# Patient Record
Sex: FEMALE | Race: WHITE | Employment: FULL TIME
[De-identification: names, ages, dates, MRNs, and addresses within clinical notes are randomized per-mention and may not be internally consistent; named-entity substitution may affect disease eponyms.]

---

## 2023-11-27 SDOH — HEALTH STABILITY: PHYSICAL HEALTH: ON AVERAGE, HOW MANY MINUTES DO YOU ENGAGE IN EXERCISE AT THIS LEVEL?: 0 MIN

## 2023-11-27 SDOH — HEALTH STABILITY: PHYSICAL HEALTH: ON AVERAGE, HOW MANY DAYS PER WEEK DO YOU ENGAGE IN MODERATE TO STRENUOUS EXERCISE (LIKE A BRISK WALK)?: 0 DAYS

## 2023-11-27 ASSESSMENT — SOCIAL DETERMINANTS OF HEALTH (SDOH)
WITHIN THE LAST YEAR, HAVE YOU BEEN KICKED, HIT, SLAPPED, OR OTHERWISE PHYSICALLY HURT BY YOUR PARTNER OR EX-PARTNER?: NO
WITHIN THE LAST YEAR, HAVE TO BEEN RAPED OR FORCED TO HAVE ANY KIND OF SEXUAL ACTIVITY BY YOUR PARTNER OR EX-PARTNER?: NO
WITHIN THE LAST YEAR, HAVE YOU BEEN HUMILIATED OR EMOTIONALLY ABUSED IN OTHER WAYS BY YOUR PARTNER OR EX-PARTNER?: YES
WITHIN THE LAST YEAR, HAVE YOU BEEN AFRAID OF YOUR PARTNER OR EX-PARTNER?: NO

## 2023-11-30 ENCOUNTER — OFFICE VISIT (OUTPATIENT)
Facility: CLINIC | Age: 33
End: 2023-11-30
Payer: MEDICARE

## 2023-11-30 VITALS
HEART RATE: 105 BPM | RESPIRATION RATE: 15 BRPM | OXYGEN SATURATION: 98 % | DIASTOLIC BLOOD PRESSURE: 80 MMHG | TEMPERATURE: 97.9 F | WEIGHT: 153 LBS | SYSTOLIC BLOOD PRESSURE: 114 MMHG | HEIGHT: 65 IN | BODY MASS INDEX: 25.49 KG/M2

## 2023-11-30 DIAGNOSIS — Z11.59 ENCOUNTER FOR HEPATITIS C SCREENING TEST FOR LOW RISK PATIENT: ICD-10-CM

## 2023-11-30 DIAGNOSIS — F41.9 ANXIETY: ICD-10-CM

## 2023-11-30 DIAGNOSIS — F32.A DEPRESSIVE DISORDER: ICD-10-CM

## 2023-11-30 DIAGNOSIS — R20.0 NUMBNESS AND TINGLING IN BOTH HANDS: ICD-10-CM

## 2023-11-30 DIAGNOSIS — F90.2 ATTENTION DEFICIT HYPERACTIVITY DISORDER (ADHD), COMBINED TYPE: ICD-10-CM

## 2023-11-30 DIAGNOSIS — M25.551 PAIN OF RIGHT HIP: ICD-10-CM

## 2023-11-30 DIAGNOSIS — E06.3 HASHIMOTO'S THYROIDITIS: ICD-10-CM

## 2023-11-30 DIAGNOSIS — K62.5 BLEEDING FROM ANUS: ICD-10-CM

## 2023-11-30 DIAGNOSIS — Z72.0 VAPES NICOTINE CONTAINING SUBSTANCE: ICD-10-CM

## 2023-11-30 DIAGNOSIS — B00.9 HSV INFECTION: ICD-10-CM

## 2023-11-30 DIAGNOSIS — F60.3 BORDERLINE PERSONALITY DISORDER IN ADULT (HCC): ICD-10-CM

## 2023-11-30 DIAGNOSIS — R09.89 SINUS COMPLAINT: ICD-10-CM

## 2023-11-30 DIAGNOSIS — Z11.4 SCREENING FOR HIV WITHOUT PRESENCE OF RISK FACTORS: ICD-10-CM

## 2023-11-30 DIAGNOSIS — Z76.89 ENCOUNTER TO ESTABLISH CARE: Primary | ICD-10-CM

## 2023-11-30 DIAGNOSIS — R20.2 NUMBNESS AND TINGLING IN BOTH HANDS: ICD-10-CM

## 2023-11-30 PROCEDURE — 99205 OFFICE O/P NEW HI 60 MIN: CPT | Performed by: STUDENT IN AN ORGANIZED HEALTH CARE EDUCATION/TRAINING PROGRAM

## 2023-11-30 PROCEDURE — 99407 BEHAV CHNG SMOKING > 10 MIN: CPT | Performed by: STUDENT IN AN ORGANIZED HEALTH CARE EDUCATION/TRAINING PROGRAM

## 2023-11-30 RX ORDER — DEXTROAMPHETAMINE SACCHARATE, AMPHETAMINE ASPARTATE, DEXTROAMPHETAMINE SULFATE AND AMPHETAMINE SULFATE 5; 5; 5; 5 MG/1; MG/1; MG/1; MG/1
1 TABLET ORAL 2 TIMES DAILY
Qty: 60 TABLET | Refills: 0 | Status: SHIPPED | OUTPATIENT
Start: 2023-11-30 | End: 2023-12-30

## 2023-11-30 RX ORDER — DEXTROAMPHETAMINE SACCHARATE, AMPHETAMINE ASPARTATE, DEXTROAMPHETAMINE SULFATE AND AMPHETAMINE SULFATE 5; 5; 5; 5 MG/1; MG/1; MG/1; MG/1
1 TABLET ORAL 2 TIMES DAILY
COMMUNITY
Start: 2023-10-22 | End: 2023-11-30 | Stop reason: SDUPTHER

## 2023-11-30 RX ORDER — VALACYCLOVIR HYDROCHLORIDE 1 G/1
1000 TABLET, FILM COATED ORAL 2 TIMES DAILY
Qty: 20 TABLET | Refills: 3 | Status: SHIPPED | OUTPATIENT
Start: 2023-11-30 | End: 2024-01-09

## 2023-11-30 SDOH — ECONOMIC STABILITY: HOUSING INSECURITY
IN THE LAST 12 MONTHS, WAS THERE A TIME WHEN YOU DID NOT HAVE A STEADY PLACE TO SLEEP OR SLEPT IN A SHELTER (INCLUDING NOW)?: NO

## 2023-11-30 SDOH — ECONOMIC STABILITY: FOOD INSECURITY: WITHIN THE PAST 12 MONTHS, THE FOOD YOU BOUGHT JUST DIDN'T LAST AND YOU DIDN'T HAVE MONEY TO GET MORE.: NEVER TRUE

## 2023-11-30 SDOH — ECONOMIC STABILITY: FOOD INSECURITY: WITHIN THE PAST 12 MONTHS, YOU WORRIED THAT YOUR FOOD WOULD RUN OUT BEFORE YOU GOT MONEY TO BUY MORE.: NEVER TRUE

## 2023-11-30 SDOH — ECONOMIC STABILITY: INCOME INSECURITY: HOW HARD IS IT FOR YOU TO PAY FOR THE VERY BASICS LIKE FOOD, HOUSING, MEDICAL CARE, AND HEATING?: NOT HARD AT ALL

## 2023-11-30 ASSESSMENT — PATIENT HEALTH QUESTIONNAIRE - PHQ9
4. FEELING TIRED OR HAVING LITTLE ENERGY: 3
SUM OF ALL RESPONSES TO PHQ QUESTIONS 1-9: 17
8. MOVING OR SPEAKING SO SLOWLY THAT OTHER PEOPLE COULD HAVE NOTICED. OR THE OPPOSITE, BEING SO FIGETY OR RESTLESS THAT YOU HAVE BEEN MOVING AROUND A LOT MORE THAN USUAL: 0
10. IF YOU CHECKED OFF ANY PROBLEMS, HOW DIFFICULT HAVE THESE PROBLEMS MADE IT FOR YOU TO DO YOUR WORK, TAKE CARE OF THINGS AT HOME, OR GET ALONG WITH OTHER PEOPLE: 2
9. THOUGHTS THAT YOU WOULD BE BETTER OFF DEAD, OR OF HURTING YOURSELF: 0
1. LITTLE INTEREST OR PLEASURE IN DOING THINGS: 1
7. TROUBLE CONCENTRATING ON THINGS, SUCH AS READING THE NEWSPAPER OR WATCHING TELEVISION: 3
6. FEELING BAD ABOUT YOURSELF - OR THAT YOU ARE A FAILURE OR HAVE LET YOURSELF OR YOUR FAMILY DOWN: 1
SUM OF ALL RESPONSES TO PHQ QUESTIONS 1-9: 17
SUM OF ALL RESPONSES TO PHQ9 QUESTIONS 1 & 2: 4
5. POOR APPETITE OR OVEREATING: 3
SUM OF ALL RESPONSES TO PHQ QUESTIONS 1-9: 17
3. TROUBLE FALLING OR STAYING ASLEEP: 3
2. FEELING DOWN, DEPRESSED OR HOPELESS: 3
SUM OF ALL RESPONSES TO PHQ QUESTIONS 1-9: 17

## 2023-11-30 NOTE — PROGRESS NOTES
Beatrice Broussard is a 29y.o. year old female who presents today for   Chief Complaint   Patient presents with    Establish Care       Is someone accompanying this pt? no    Is the patient using any DME equipment during OV? no    Depression Screenin/30/2023     3:22 PM   PHQ-9 Questionaire   Little interest or pleasure in doing things 1   Feeling down, depressed, or hopeless 3   Trouble falling or staying asleep, or sleeping too much 3   Feeling tired or having little energy 3   Poor appetite or overeating 3   Feeling bad about yourself - or that you are a failure or have let yourself or your family down 1   Trouble concentrating on things, such as reading the newspaper or watching television 3   Moving or speaking so slowly that other people could have noticed. Or the opposite - being so fidgety or restless that you have been moving around a lot more than usual 0   Thoughts that you would be better off dead, or of hurting yourself in some way 0   PHQ-9 Total Score 17   If you checked off any problems, how difficult have these problems made it for you to do your work, take care of things at home, or get along with other people? 2       Abuse Screenin/30/2023     3:00 PM   AMB Abuse Screening   Do you ever feel afraid of your partner? N   Are you in a relationship with someone who physically or mentally threatens you? N   Is it safe for you to go home? Y       Learning Assessment:  No question data found. Fall Risk:       No data to display                    Coordination of Care:   1. \"Have you been to the ER, urgent care clinic since your last visit? Hospitalized since your last visit? \" no    2. \"Have you seen or consulted any other health care providers outside of the [address] [place] [address] since your last visit? \" no    3. For patients aged 43-73: Has the patient had a colonoscopy / FIT/ Cologuard? NA    If the patient is female:    4.  For patients aged 43-66: Has the patient had a mammogram
that you are a failure or have let yourself or your family down 1   Trouble concentrating on things, such as reading the newspaper or watching television 3   Moving or speaking so slowly that other people could have noticed. Or the opposite - being so fidgety or restless that you have been moving around a lot more than usual 0   Thoughts that you would be better off dead, or of hurting yourself in some way 0   PHQ-2 Score 4   PHQ-9 Total Score 17   If you checked off any problems, how difficult have these problems made it for you to do your work, take care of things at home, or get along with other people? 2       Past Medical History  No past medical history on file. Surgical History  No past surgical history on file. Current Outpatient Medications   Medication Sig    valACYclovir (VALTREX) 1 g tablet Take 1 tablet by mouth 2 times daily    amphetamine-dextroamphetamine (ADDERALL) 20 MG tablet Take 1 tablet by mouth 2 times daily for 30 days. Max Daily Amount: 2 tablets     No current facility-administered medications for this visit. No Known Allergies   Family History  No family history on file.      Social History     Tobacco Use    Smoking status: Every Day     Types: Cigarettes    Smokeless tobacco: Never   Substance Use Topics    Alcohol use: Yes     Comment: social    Drug use: Yes     Types: Marijuana Graciella Muzzy)      Health Maintenance   Topic Date Due    Hepatitis B vaccine (1 of 3 - 3-dose series) Never done    COVID-19 Vaccine (1) Never done    Varicella vaccine (1 of 2 - 2-dose childhood series) Never done    Pneumococcal 0-64 years Vaccine (1 - PCV) Never done    HIV screen  Never done    Hepatitis C screen  Never done    DTaP/Tdap/Td vaccine (1 - Tdap) Never done    Cervical cancer screen  Never done    Flu vaccine (1) Never done    Depression Monitoring  11/30/2024    Hepatitis A vaccine  Aged Out    Hib vaccine  Aged Out    HPV vaccine  Aged Out    Meningococcal (ACWY) vaccine  Aged Out

## 2023-11-30 NOTE — PATIENT INSTRUCTIONS
It was nice meeting you today. Please have your labs done either immediately after this visit, or you can schedule to come back for labs. Please do your labs at least a week before your next appointment. If you have questions or concerns, My Chart is the fastest way to get in touch with me and the clinical staff. We will gradually address all of your problems. This will take time. You have a lot of medical concerns that will require visits to many specialists.

## 2023-12-01 PROBLEM — F41.9 ANXIETY: Status: ACTIVE | Noted: 2023-12-01

## 2023-12-01 PROBLEM — R20.2 NUMBNESS AND TINGLING IN BOTH HANDS: Status: ACTIVE | Noted: 2023-12-01

## 2023-12-01 PROBLEM — R09.89 SINUS COMPLAINT: Status: ACTIVE | Noted: 2021-11-27

## 2023-12-01 PROBLEM — Z72.0 VAPES NICOTINE CONTAINING SUBSTANCE: Status: ACTIVE | Noted: 2023-12-01

## 2023-12-01 PROBLEM — G89.29 CHRONIC RIGHT SI JOINT PAIN: Status: ACTIVE | Noted: 2023-12-01

## 2023-12-01 PROBLEM — F60.3 BORDERLINE PERSONALITY DISORDER IN ADULT (HCC): Status: ACTIVE | Noted: 2018-11-27

## 2023-12-01 PROBLEM — E06.3 HASHIMOTO'S THYROIDITIS: Status: ACTIVE | Noted: 2023-12-01

## 2023-12-01 PROBLEM — K62.5 BLEEDING FROM ANUS: Status: ACTIVE | Noted: 2020-11-27

## 2023-12-01 PROBLEM — R20.0 NUMBNESS AND TINGLING IN BOTH HANDS: Status: ACTIVE | Noted: 2023-12-01

## 2023-12-01 PROBLEM — F32.A DEPRESSIVE DISORDER: Status: ACTIVE | Noted: 2023-12-01

## 2023-12-01 PROBLEM — M53.3 CHRONIC RIGHT SI JOINT PAIN: Status: ACTIVE | Noted: 2023-12-01

## 2023-12-01 NOTE — ASSESSMENT & PLAN NOTE
- possible hemorrhoids  - colo, EGD, bx all negative; colo showed internal hemorrhoid  - advised pt to stop taking meloxicam

## 2023-12-05 ENCOUNTER — OFFICE VISIT (OUTPATIENT)
Facility: CLINIC | Age: 33
End: 2023-12-05
Payer: MEDICARE

## 2023-12-05 VITALS
HEART RATE: 87 BPM | OXYGEN SATURATION: 98 % | BODY MASS INDEX: 25.79 KG/M2 | TEMPERATURE: 97.6 F | RESPIRATION RATE: 16 BRPM | DIASTOLIC BLOOD PRESSURE: 74 MMHG | HEIGHT: 65 IN | WEIGHT: 154.8 LBS | SYSTOLIC BLOOD PRESSURE: 97 MMHG

## 2023-12-05 DIAGNOSIS — K62.5 BLEEDING FROM ANUS: Primary | ICD-10-CM

## 2023-12-05 DIAGNOSIS — M25.551 PAIN OF RIGHT HIP: ICD-10-CM

## 2023-12-05 PROCEDURE — 99214 OFFICE O/P EST MOD 30 MIN: CPT | Performed by: STUDENT IN AN ORGANIZED HEALTH CARE EDUCATION/TRAINING PROGRAM

## 2023-12-05 NOTE — PROGRESS NOTES
Tosin Whitaker is a 29y.o. year old female who presents today for   Chief Complaint   Patient presents with    Follow-up     Stomach issues       Is someone accompanying this pt? ***    Is the patient using any DME equipment during OV? ***    Depression Screenin/30/2023     3:22 PM   PHQ-9 Questionaire   Little interest or pleasure in doing things 1   Feeling down, depressed, or hopeless 3   Trouble falling or staying asleep, or sleeping too much 3   Feeling tired or having little energy 3   Poor appetite or overeating 3   Feeling bad about yourself - or that you are a failure or have let yourself or your family down 1   Trouble concentrating on things, such as reading the newspaper or watching television 3   Moving or speaking so slowly that other people could have noticed. Or the opposite - being so fidgety or restless that you have been moving around a lot more than usual 0   Thoughts that you would be better off dead, or of hurting yourself in some way 0   PHQ-9 Total Score 17   If you checked off any problems, how difficult have these problems made it for you to do your work, take care of things at home, or get along with other people? 2       Abuse Screenin/30/2023     3:00 PM   AMB Abuse Screening   Do you ever feel afraid of your partner? N   Are you in a relationship with someone who physically or mentally threatens you? N   Is it safe for you to go home? Y       Learning Assessment:  No question data found. Fall Risk:       No data to display                    Coordination of Care:   1. \"Have you been to the ER, urgent care clinic since your last visit? Hospitalized since your last visit? \" no    2. \"Have you seen or consulted any other health care providers outside of the [address] [place] [address] since your last visit? \" no    3. For patients aged 43-73: Has the patient had a colonoscopy / FIT/ Cologuard? no    If the patient is female:    4.  For patients aged 43-66: Has the patient
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
## 2023-12-06 ENCOUNTER — TELEPHONE (OUTPATIENT)
Facility: CLINIC | Age: 33
End: 2023-12-06

## 2023-12-06 NOTE — TELEPHONE ENCOUNTER
Pt called about a Rx she is currently taking and was suppose to  from the pharmacy but they are saying the Rx sent has to be paid out of pocket. Pt would like a call back. I believe she wants a medication that is covered  through her insurance. DMA/TNP

## 2023-12-07 NOTE — PROGRESS NOTES
Martina Guzman is a 29y.o. year old female who presents today for   Chief Complaint   Patient presents with    Follow-up     Stomach issues       Is someone accompanying this pt? no    Is the patient using any DME equipment during OV? no    Depression Screenin/30/2023     3:22 PM   PHQ-9 Questionaire   Little interest or pleasure in doing things 1   Feeling down, depressed, or hopeless 3   Trouble falling or staying asleep, or sleeping too much 3   Feeling tired or having little energy 3   Poor appetite or overeating 3   Feeling bad about yourself - or that you are a failure or have let yourself or your family down 1   Trouble concentrating on things, such as reading the newspaper or watching television 3   Moving or speaking so slowly that other people could have noticed. Or the opposite - being so fidgety or restless that you have been moving around a lot more than usual 0   Thoughts that you would be better off dead, or of hurting yourself in some way 0   PHQ-9 Total Score 17   If you checked off any problems, how difficult have these problems made it for you to do your work, take care of things at home, or get along with other people? 2       Abuse Screenin/30/2023     3:00 PM   AMB Abuse Screening   Do you ever feel afraid of your partner? N   Are you in a relationship with someone who physically or mentally threatens you? N   Is it safe for you to go home? Y       Learning Assessment:  No question data found. Fall Risk:       No data to display                    Coordination of Care:   1. \"Have you been to the ER, urgent care clinic since your last visit? Hospitalized since your last visit? \" no    2. \"Have you seen or consulted any other health care providers outside of the [address] [place] [address] since your last visit? \" no    3. For patients aged 43-73: Has the patient had a colonoscopy / FIT/ Cologuard? N/a    If the patient is female:    4.  For patients aged 43-66: Has the patient

## 2023-12-14 ENCOUNTER — OFFICE VISIT (OUTPATIENT)
Facility: CLINIC | Age: 33
End: 2023-12-14
Payer: MEDICARE

## 2023-12-14 VITALS
WEIGHT: 154.8 LBS | BODY MASS INDEX: 25.79 KG/M2 | RESPIRATION RATE: 17 BRPM | SYSTOLIC BLOOD PRESSURE: 103 MMHG | HEIGHT: 65 IN | OXYGEN SATURATION: 100 % | TEMPERATURE: 96.7 F | HEART RATE: 108 BPM | DIASTOLIC BLOOD PRESSURE: 77 MMHG

## 2023-12-14 DIAGNOSIS — R09.89 SINUS COMPLAINT: Primary | ICD-10-CM

## 2023-12-14 DIAGNOSIS — N94.10 DYSPAREUNIA IN FEMALE: ICD-10-CM

## 2023-12-14 PROCEDURE — 99214 OFFICE O/P EST MOD 30 MIN: CPT | Performed by: STUDENT IN AN ORGANIZED HEALTH CARE EDUCATION/TRAINING PROGRAM

## 2023-12-14 RX ORDER — AZELASTINE HYDROCHLORIDE, FLUTICASONE PROPIONATE 137; 50 UG/1; UG/1
1 SPRAY, METERED NASAL 2 TIMES DAILY
Qty: 23 G | Refills: 5 | Status: SHIPPED | OUTPATIENT
Start: 2023-12-14

## 2023-12-14 NOTE — PATIENT INSTRUCTIONS
[facility]  Address: [id number], [facility], [address]  Phone: [phone]  Please call the number to schedule. For your ears/nose/sinuses please take the nasal spray twice daily. You should receive a phone call from ENT.

## 2023-12-14 NOTE — ASSESSMENT & PLAN NOTE
- possible chronic rhinosinusitis, less likely chronic otitis media/externa  - trial of nasal spray  - ENT referral

## 2023-12-14 NOTE — PROGRESS NOTES
Levi Elliott is a 29y.o. year old female who presents today for   Chief Complaint   Patient presents with    Follow-up       Is someone accompanying this pt? no    Is the patient using any DME equipment during  [address]? no    Depression Screenin/30/2023     3:22 PM   PHQ-9 Questionaire   Little interest or pleasure in doing things 1   Feeling down, depressed, or hopeless 3   Trouble falling or staying asleep, or sleeping too much 3   Feeling tired or having little energy 3   Poor appetite or overeating 3   Feeling bad about yourself - or that you are a failure or have let yourself or your family down 1   Trouble concentrating on things, such as reading the newspaper or watching television 3   Moving or speaking so slowly that other people could have noticed. Or the opposite - being so fidgety or restless that you have been moving around a lot more than usual 0   Thoughts that you would be better off dead, or of hurting yourself in some way 0   PHQ-9 Total Score 17   If you checked off any problems, how difficult have these problems made it for you to do your work, take care of things at home, or get along with other people? 2       Abuse Screenin/30/2023     3:00 PM   AMB Abuse Screening   Do you ever feel afraid of your partner? N   Are you in a relationship with someone who physically or mentally threatens you? N   Is it safe for you to go home? Y       Learning Assessment:  No question data found. Fall Risk:       No data to display                    Coordination of Care:   1. \"Have you been to the ER, urgent care clinic since your last visit? Hospitalized since your last visit? \" no    2. \"Have you seen or consulted any other health care providers outside of the [address] [place] [address] since your last visit? \" no    3. For patients aged 43-73: Has the patient had a colonoscopy / FIT/ Cologuard? no    If the patient is female:    4.  For patients aged 43-66: Has the patient had a mammogram
visit. An electronic signature was used to authenticate this note.     --Nico Boyd MD

## 2024-01-18 DIAGNOSIS — F90.2 ATTENTION DEFICIT HYPERACTIVITY DISORDER (ADHD), COMBINED TYPE: ICD-10-CM

## 2024-01-18 RX ORDER — DEXTROAMPHETAMINE SACCHARATE, AMPHETAMINE ASPARTATE, DEXTROAMPHETAMINE SULFATE AND AMPHETAMINE SULFATE 5; 5; 5; 5 MG/1; MG/1; MG/1; MG/1
1 TABLET ORAL 2 TIMES DAILY
Qty: 60 TABLET | Refills: 0 | Status: SHIPPED | OUTPATIENT
Start: 2024-01-18 | End: 2024-02-17

## 2024-01-18 NOTE — TELEPHONE ENCOUNTER
From: Dede Valentine  To: Office of Dr. Mason Oliveira  Sent: 1/17/2024 1:08 AM EST  Subject: Medication Renewal Request    Refills have been requested for the following medications:     amphetamine-dextroamphetamine (ADDERALL) 20 MG tablet [Dr. Mason Oliveira MD]   Patient Comment: I had to switch pharmacy to [facility] due to insurance.    Preferred pharmacy: Other - [facility]

## 2024-01-29 ENCOUNTER — OFFICE VISIT (OUTPATIENT)
Age: 34
End: 2024-01-29

## 2024-01-29 VITALS — WEIGHT: 156 LBS | BODY MASS INDEX: 25.99 KG/M2 | HEIGHT: 65 IN

## 2024-01-29 DIAGNOSIS — G56.23 CUBITAL TUNNEL SYNDROME OF BOTH UPPER EXTREMITIES: Primary | ICD-10-CM

## 2024-01-29 NOTE — PROGRESS NOTES
Dede Valentine is a 33 y.o. female right handed .  Worker's Compensation and legal considerations: none    Chief Complaint   Patient presents with    Hand Pain     bilat     Pain Score:   5    Subjective:     HPI: Patient presents today with concern of bilateral hand numbness and tingling.  She isolates her symptoms mainly to the little and ring fingers.  Of note, she states she has a chronic history of bilateral hand swelling since about the age of 18 but symptoms have worsened over the last year or so. She also admits hand cramping. Symptoms worse at night    Date of onset:  Chronic  Injury: None  Prior Treatment:  No    ROS: Review of Systems - General ROS: negative except HPI    History reviewed. No pertinent past medical history.    History reviewed. No pertinent surgical history.     Current Outpatient Medications   Medication Sig Dispense Refill    amphetamine-dextroamphetamine (ADDERALL) 20 MG tablet Take 1 tablet by mouth 2 times daily for 30 days. Max Daily Amount: 2 tablets 60 tablet 0    Azelastine-Fluticasone 137-50 MCG/ACT SUSP 1 spray by Nasal route 2 times daily 23 g 5     No current facility-administered medications for this visit.       No Known Allergies      Ht 1.651 m (5' 5\")   Wt 70.8 kg (156 lb)   BMI 25.96 kg/m²   Physical Exam  Vitals and nursing note reviewed.   Constitutional:       Appearance: Normal appearance.   HENT:      Head: Normocephalic and atraumatic.   Cardiovascular:      Pulses: Normal pulses.   Pulmonary:      Effort: Pulmonary effort is normal. No respiratory distress.   Musculoskeletal:         General: No swelling, tenderness, deformity or signs of injury. Normal range of motion.      Cervical back: Normal range of motion and neck supple.      Right lower leg: No edema.      Left lower leg: No edema.   Skin:     General: Skin is warm and dry.      Capillary Refill: Capillary refill takes less than 2 seconds.      Findings: No bruising or erythema.

## 2024-03-04 DIAGNOSIS — F90.2 ATTENTION DEFICIT HYPERACTIVITY DISORDER (ADHD), COMBINED TYPE: ICD-10-CM

## 2024-03-04 RX ORDER — DEXTROAMPHETAMINE SACCHARATE, AMPHETAMINE ASPARTATE, DEXTROAMPHETAMINE SULFATE AND AMPHETAMINE SULFATE 5; 5; 5; 5 MG/1; MG/1; MG/1; MG/1
1 TABLET ORAL 2 TIMES DAILY
Qty: 60 TABLET | Refills: 0 | OUTPATIENT
Start: 2024-03-04 | End: 2024-04-03

## 2024-03-04 NOTE — TELEPHONE ENCOUNTER
Patient called stating she missed her lab and 1 mo follow-up appointments, so she rescheduled.    Future Appointments   Date Time Provider Department   3/5/2024  3:20 PM DEBORAH, LAB [facility]   3/19/2024  1:30 PM Mason Oliveira MD [facility]     Also, patient would like to know if Dr. Oliveira can send a refill for Adderall to her pharmacy because she is completely out?  Please call patient to advise.      Thank you.

## 2024-03-05 ENCOUNTER — HOSPITAL ENCOUNTER (OUTPATIENT)
Facility: HOSPITAL | Age: 34
Setting detail: SPECIMEN
Discharge: HOME | End: 2024-03-08
Payer: COMMERCIAL

## 2024-03-05 DIAGNOSIS — Z76.89 ENCOUNTER TO ESTABLISH CARE: ICD-10-CM

## 2024-03-05 DIAGNOSIS — Z11.59 ENCOUNTER FOR HEPATITIS C SCREENING TEST FOR LOW RISK PATIENT: ICD-10-CM

## 2024-03-05 DIAGNOSIS — E06.3 HASHIMOTO'S THYROIDITIS: ICD-10-CM

## 2024-03-05 DIAGNOSIS — Z11.4 SCREENING FOR HIV WITHOUT PRESENCE OF RISK FACTORS: ICD-10-CM

## 2024-03-05 LAB
ALBUMIN SERPL-MCNC: 3.8 G/DL (ref 3.4–5)
ALBUMIN/GLOB SERPL: 1.4 (ref 0.8–1.7)
ALP SERPL-CCNC: 63 U/L (ref 45–117)
ALT SERPL-CCNC: 20 U/L (ref 13–56)
ANION GAP SERPL CALC-SCNC: 6 MMOL/L (ref 3–18)
AST SERPL-CCNC: 11 U/L (ref 10–38)
BILIRUB SERPL-MCNC: 0.3 MG/DL (ref 0.2–1)
BUN SERPL-MCNC: 5 MG/DL (ref 7–18)
BUN/CREAT SERPL: 8 (ref 12–20)
CALCIUM SERPL-MCNC: 8.7 MG/DL (ref 8.5–10.1)
CHLORIDE SERPL-SCNC: 108 MMOL/L (ref 100–111)
CO2 SERPL-SCNC: 27 MMOL/L (ref 21–32)
CREAT SERPL-MCNC: 0.63 MG/DL (ref 0.6–1.3)
ERYTHROCYTE [DISTWIDTH] IN BLOOD BY AUTOMATED COUNT: 13.7 % (ref 11.6–14.5)
GLOBULIN SER CALC-MCNC: 2.7 G/DL (ref 2–4)
GLUCOSE SERPL-MCNC: 108 MG/DL (ref 74–99)
HCT VFR BLD AUTO: 39.5 % (ref 35–45)
HGB BLD-MCNC: 13.2 G/DL (ref 12–16)
MCH RBC QN AUTO: 31.9 PG (ref 24–34)
MCHC RBC AUTO-ENTMCNC: 33.4 G/DL (ref 31–37)
MCV RBC AUTO: 95.4 FL (ref 78–100)
NRBC # BLD: 0 K/UL (ref 0–0.01)
NRBC BLD-RTO: 0 PER 100 WBC
PLATELET # BLD AUTO: 292 K/UL (ref 135–420)
PMV BLD AUTO: 11.7 FL (ref 9.2–11.8)
POTASSIUM SERPL-SCNC: 3.5 MMOL/L (ref 3.5–5.5)
PROT SERPL-MCNC: 6.5 G/DL (ref 6.4–8.2)
RBC # BLD AUTO: 4.14 M/UL (ref 4.2–5.3)
SODIUM SERPL-SCNC: 141 MMOL/L (ref 136–145)
T4 FREE SERPL-MCNC: 1 NG/DL (ref 0.7–1.5)
TSH SERPL DL<=0.05 MIU/L-ACNC: 0.46 UIU/ML (ref 0.36–3.74)
WBC # BLD AUTO: 7.1 K/UL (ref 4.6–13.2)

## 2024-03-05 PROCEDURE — 85027 COMPLETE CBC AUTOMATED: CPT

## 2024-03-05 PROCEDURE — 84439 ASSAY OF FREE THYROXINE: CPT

## 2024-03-05 PROCEDURE — 36415 COLL VENOUS BLD VENIPUNCTURE: CPT

## 2024-03-05 PROCEDURE — 84443 ASSAY THYROID STIM HORMONE: CPT

## 2024-03-05 PROCEDURE — 80053 COMPREHEN METABOLIC PANEL: CPT

## 2024-03-05 PROCEDURE — 86706 HEP B SURFACE ANTIBODY: CPT

## 2024-03-05 PROCEDURE — 87389 HIV-1 AG W/HIV-1&-2 AB AG IA: CPT

## 2024-03-05 PROCEDURE — 86803 HEPATITIS C AB TEST: CPT

## 2024-03-06 DIAGNOSIS — F90.2 ATTENTION DEFICIT HYPERACTIVITY DISORDER (ADHD), COMBINED TYPE: ICD-10-CM

## 2024-03-06 LAB
HBV SURFACE AB SER QL IA: NEGATIVE
HBV SURFACE AB SERPL IA-ACNC: <3.1 MIU/ML
HEP BS AB COMMENT: ABNORMAL
HIV 1+2 AB+HIV1 P24 AG SERPL QL IA: NONREACTIVE
HIV 1/2 RESULT COMMENT: NORMAL

## 2024-03-06 RX ORDER — DEXTROAMPHETAMINE SACCHARATE, AMPHETAMINE ASPARTATE, DEXTROAMPHETAMINE SULFATE AND AMPHETAMINE SULFATE 5; 5; 5; 5 MG/1; MG/1; MG/1; MG/1
1 TABLET ORAL 2 TIMES DAILY
Qty: 60 TABLET | Refills: 0 | Status: SHIPPED | OUTPATIENT
Start: 2024-03-06 | End: 2024-04-05

## 2024-03-06 NOTE — PROGRESS NOTES
Pt requesting adderall refill.   reviewed; no suspicious activity. Will fill now and see pt tomorrow. Plan for UDS.

## 2024-03-07 ENCOUNTER — PROCEDURE VISIT (OUTPATIENT)
Age: 34
End: 2024-03-07
Payer: COMMERCIAL

## 2024-03-07 VITALS
SYSTOLIC BLOOD PRESSURE: 121 MMHG | HEART RATE: 105 BPM | BODY MASS INDEX: 25.66 KG/M2 | DIASTOLIC BLOOD PRESSURE: 75 MMHG | WEIGHT: 154 LBS | OXYGEN SATURATION: 99 % | HEIGHT: 65 IN

## 2024-03-07 DIAGNOSIS — R94.131 ABNORMAL EMG: ICD-10-CM

## 2024-03-07 DIAGNOSIS — G56.22 CUBITAL TUNNEL SYNDROME ON LEFT: ICD-10-CM

## 2024-03-07 DIAGNOSIS — R20.0 BILATERAL HAND NUMBNESS: Primary | ICD-10-CM

## 2024-03-07 PROCEDURE — 95911 NRV CNDJ TEST 9-10 STUDIES: CPT | Performed by: PHYSICAL MEDICINE & REHABILITATION

## 2024-03-07 PROCEDURE — 95886 MUSC TEST DONE W/N TEST COMP: CPT | Performed by: PHYSICAL MEDICINE & REHABILITATION

## 2024-03-07 NOTE — PROGRESS NOTES
[facility]  [facility] [facility], [address]  [place] [address]  Phone: [phone]  Fax: [phone]    Dede Valentine  : 1990  PCP: Mason Oliveira MD  3/7/2024    ELECTROMYOGRAPHY AND NERVE CONDUCTION STUDIES    Dede Valentine was referred by Dr. Ivory for electrodiagnostic evaluation of numbness and tingling of BUE.     NCV & EMG Findings:  Evaluation of the left ulnar (ADM) motor nerve showed reduced amplitude (4.0 mV) and decreased conduction velocity (Abv Elbow-Bel Elbow, 29 m/s).  The left ulnar sensory nerve showed no response.  All remaining nerves (as indicated in the following tables) were within normal limits.    INTERPRETATION  This is an abnormal electrodiagnostic examination. 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      CLINICAL INTERPRETATION  Her electrodiagnostic finding of ulnar mononeuropathy at the left elbow appears consistent with her left arm symptoms.       HISTORY OF PRESENT ILLNESS  Dede Valentine is a 33 y.o. female.    Pt presents today with BUE EMG evaluation for numbness and tingling of both hands, most prominent in ring and pinky fingers. Onset of sxs started several years ago.     PAST MEDICAL HISTORY   No past medical history on file.    No past surgical history on file..      MEDICATIONS    Current Outpatient Medications   Medication Sig Dispense Refill    amphetamine-dextroamphetamine (ADDERALL) 20 MG tablet Take 1 tablet by mouth 2 times daily for 30 days. Max Daily Amount: 2 tablets 60 tablet 0    Azelastine-Fluticasone 137-50 MCG/ACT SUSP 1 spray by Nasal route 2 times daily 23 g 5     No current facility-administered medications for this visit.        ALLERGIES  No Known Allergies

## 2024-03-08 ENCOUNTER — OFFICE VISIT (OUTPATIENT)
Age: 34
End: 2024-03-08

## 2024-03-08 VITALS
WEIGHT: 152 LBS | DIASTOLIC BLOOD PRESSURE: 66 MMHG | HEIGHT: 65 IN | BODY MASS INDEX: 25.33 KG/M2 | SYSTOLIC BLOOD PRESSURE: 94 MMHG

## 2024-03-08 DIAGNOSIS — Z01.818 PRE-OPERATIVE EXAM: ICD-10-CM

## 2024-03-08 DIAGNOSIS — M62.542 ATROPHY OF MUSCLE OF LEFT HAND: ICD-10-CM

## 2024-03-08 DIAGNOSIS — G56.22 CUBITAL TUNNEL SYNDROME ON LEFT: Primary | ICD-10-CM

## 2024-03-08 DIAGNOSIS — Z87.891 SMOKER WITHIN LAST 12 MONTHS: ICD-10-CM

## 2024-03-08 LAB
HCV AB SERPL QL IA: NORMAL
HCV IGG SERPL QL IA: NON REACTIVE S/CO RATIO

## 2024-03-08 NOTE — PROGRESS NOTES
Dede Valentine is a 33 y.o. female right handed .  Worker's Compensation and legal considerations: none    Chief Complaint   Patient presents with    Hand Pain     Bilateral       Pain Score:   5    Subjective:     3/8/2024 HPI: Patient presents today for EMG review. Her symptoms are essentially unchanged.  Numbness and tingling, L>R.  She wants to talk about surgery.    HPI: Patient presents today with concern of bilateral hand numbness and tingling.  She isolates her symptoms mainly to the little and ring fingers.  Of note, she states she has a chronic history of bilateral hand swelling since about the age of 18 but symptoms have worsened over the last year or so. She also admits hand cramping. Symptoms worse at night    Date of onset:  Chronic  Injury: None  Prior Treatment:  No    ROS: Review of Systems - General ROS: negative except HPI    History reviewed. No pertinent past medical history.    History reviewed. No pertinent surgical history.     Current Outpatient Medications   Medication Sig Dispense Refill    amphetamine-dextroamphetamine (ADDERALL) 20 MG tablet Take 1 tablet by mouth 2 times daily for 30 days. Max Daily Amount: 2 tablets 60 tablet 0    Azelastine-Fluticasone 137-50 MCG/ACT SUSP 1 spray by Nasal route 2 times daily 23 g 5     No current facility-administered medications for this visit.       No Known Allergies      BP 94/66 (Site: Right Upper Arm, Position: Sitting, Cuff Size: Small Adult)   Ht 1.651 m (5' 5\")   Wt 68.9 kg (152 lb)   BMI 25.29 kg/m²   Physical Exam  Vitals and nursing note reviewed.   Constitutional:       General: She is not in acute distress.     Appearance: Normal appearance.   HENT:      Head: Normocephalic and atraumatic.      Nose: Nose normal.      Mouth/Throat:      Mouth: Mucous membranes are moist.   Eyes:      Extraocular Movements: Extraocular movements intact.      Pupils: Pupils are equal, round, and reactive to light.   Cardiovascular:

## 2024-03-19 ENCOUNTER — OFFICE VISIT (OUTPATIENT)
Facility: CLINIC | Age: 34
End: 2024-03-19
Payer: COMMERCIAL

## 2024-03-19 VITALS
HEIGHT: 65 IN | SYSTOLIC BLOOD PRESSURE: 108 MMHG | TEMPERATURE: 98.4 F | OXYGEN SATURATION: 99 % | RESPIRATION RATE: 18 BRPM | DIASTOLIC BLOOD PRESSURE: 69 MMHG | HEART RATE: 88 BPM | BODY MASS INDEX: 25.26 KG/M2 | WEIGHT: 151.6 LBS

## 2024-03-19 DIAGNOSIS — F90.2 ATTENTION DEFICIT HYPERACTIVITY DISORDER (ADHD), COMBINED TYPE: ICD-10-CM

## 2024-03-19 DIAGNOSIS — F41.1 GAD (GENERALIZED ANXIETY DISORDER): ICD-10-CM

## 2024-03-19 DIAGNOSIS — F33.2 SEVERE EPISODE OF RECURRENT MAJOR DEPRESSIVE DISORDER, WITHOUT PSYCHOTIC FEATURES (HCC): Primary | ICD-10-CM

## 2024-03-19 PROCEDURE — 99214 OFFICE O/P EST MOD 30 MIN: CPT | Performed by: STUDENT IN AN ORGANIZED HEALTH CARE EDUCATION/TRAINING PROGRAM

## 2024-03-19 RX ORDER — CLONAZEPAM 0.5 MG/1
0.25 TABLET ORAL 2 TIMES DAILY PRN
Qty: 30 TABLET | Refills: 0 | Status: SHIPPED | OUTPATIENT
Start: 2024-03-19 | End: 2024-04-18

## 2024-03-19 ASSESSMENT — ANXIETY QUESTIONNAIRES
3. WORRYING TOO MUCH ABOUT DIFFERENT THINGS: NEARLY EVERY DAY
2. NOT BEING ABLE TO STOP OR CONTROL WORRYING: NEARLY EVERY DAY
GAD7 TOTAL SCORE: 21
7. FEELING AFRAID AS IF SOMETHING AWFUL MIGHT HAPPEN: NEARLY EVERY DAY
5. BEING SO RESTLESS THAT IT IS HARD TO SIT STILL: NEARLY EVERY DAY
4. TROUBLE RELAXING: NEARLY EVERY DAY
6. BECOMING EASILY ANNOYED OR IRRITABLE: NEARLY EVERY DAY
1. FEELING NERVOUS, ANXIOUS, OR ON EDGE: NEARLY EVERY DAY
IF YOU CHECKED OFF ANY PROBLEMS ON THIS QUESTIONNAIRE, HOW DIFFICULT HAVE THESE PROBLEMS MADE IT FOR YOU TO DO YOUR WORK, TAKE CARE OF THINGS AT HOME, OR GET ALONG WITH OTHER PEOPLE: EXTREMELY DIFFICULT

## 2024-03-19 ASSESSMENT — PATIENT HEALTH QUESTIONNAIRE - PHQ9
7. TROUBLE CONCENTRATING ON THINGS, SUCH AS READING THE NEWSPAPER OR WATCHING TELEVISION: NEARLY EVERY DAY
6. FEELING BAD ABOUT YOURSELF - OR THAT YOU ARE A FAILURE OR HAVE LET YOURSELF OR YOUR FAMILY DOWN: NEARLY EVERY DAY
10. IF YOU CHECKED OFF ANY PROBLEMS, HOW DIFFICULT HAVE THESE PROBLEMS MADE IT FOR YOU TO DO YOUR WORK, TAKE CARE OF THINGS AT HOME, OR GET ALONG WITH OTHER PEOPLE: EXTREMELY DIFFICULT
9. THOUGHTS THAT YOU WOULD BE BETTER OFF DEAD, OR OF HURTING YOURSELF: SEVERAL DAYS
8. MOVING OR SPEAKING SO SLOWLY THAT OTHER PEOPLE COULD HAVE NOTICED. OR THE OPPOSITE, BEING SO FIGETY OR RESTLESS THAT YOU HAVE BEEN MOVING AROUND A LOT MORE THAN USUAL: NEARLY EVERY DAY
SUM OF ALL RESPONSES TO PHQ QUESTIONS 1-9: 25
1. LITTLE INTEREST OR PLEASURE IN DOING THINGS: NEARLY EVERY DAY
5. POOR APPETITE OR OVEREATING: NEARLY EVERY DAY
SUM OF ALL RESPONSES TO PHQ QUESTIONS 1-9: 25
4. FEELING TIRED OR HAVING LITTLE ENERGY: NEARLY EVERY DAY
SUM OF ALL RESPONSES TO PHQ9 QUESTIONS 1 & 2: 6
SUM OF ALL RESPONSES TO PHQ QUESTIONS 1-9: 25
SUM OF ALL RESPONSES TO PHQ QUESTIONS 1-9: 24

## 2024-03-19 ASSESSMENT — COLUMBIA-SUICIDE SEVERITY RATING SCALE - C-SSRS
1. WITHIN THE PAST MONTH, HAVE YOU WISHED YOU WERE DEAD OR WISHED YOU COULD GO TO SLEEP AND NOT WAKE UP?: YES
2. HAVE YOU ACTUALLY HAD ANY THOUGHTS OF KILLING YOURSELF?: NO
6. HAVE YOU EVER DONE ANYTHING, STARTED TO DO ANYTHING, OR PREPARED TO DO ANYTHING TO END YOUR LIFE?: NO

## 2024-03-19 NOTE — PROGRESS NOTES
Dede Valentine is a 33 y.o. year old female who presents today for   Chief Complaint   Patient presents with    Follow-up     3 month f/u/ med request and referral       Is someone accompanying this pt? no    Is the patient using any DME equipment during OV? no    Depression Screenin/30/2023     3:22 PM   PHQ-9 Questionaire   Little interest or pleasure in doing things 1   Feeling down, depressed, or hopeless 3   Trouble falling or staying asleep, or sleeping too much 3   Feeling tired or having little energy 3   Poor appetite or overeating 3   Feeling bad about yourself - or that you are a failure or have let yourself or your family down 1   Trouble concentrating on things, such as reading the newspaper or watching television 3   Moving or speaking so slowly that other people could have noticed. Or the opposite - being so fidgety or restless that you have been moving around a lot more than usual 0   Thoughts that you would be better off dead, or of hurting yourself in some way 0   PHQ-9 Total Score 17   If you checked off any problems, how difficult have these problems made it for you to do your work, take care of things at home, or get along with other people? 2       Abuse Screenin/30/2023     3:00 PM   AMB Abuse Screening   Do you ever feel afraid of your partner? N   Are you in a relationship with someone who physically or mentally threatens you? N   Is it safe for you to go home? Y       Learning Assessment:  No question data found.    Fall Risk:       No data to display                    Coordination of Care:   1. \"Have you been to the ER, urgent care clinic since your last visit?  Hospitalized since your last visit?\" no    2. \"Have you seen or consulted any other health care providers outside of the [facility] since your last visit?\" no    3. For patients aged 45-75: Has the patient had a colonoscopy / FIT/ Cologuard? N/a    If the patient is female:    4. For patients aged

## 2024-03-19 NOTE — ASSESSMENT & PLAN NOTE
- out of control; no SI/HI  - Sertraline  - plan for psych to take over medication management; therapy would be fruitful  - pt was in a much better mood and more positive at the end of the encounter

## 2024-03-19 NOTE — PATIENT INSTRUCTIONS
Call the number to schedule your appointment.    [facility]  Fax# [phone]  Phone#[phone]

## 2024-03-19 NOTE — PROGRESS NOTES
Dede Valentine (:  1990) is a 33 y.o. female here for evaluation of the following chief complaint(s):  Follow-up (3 month f/u/ med request and referral)        ASSESSMENT/PLAN:  1. Severe episode of recurrent major depressive disorder, without psychotic features (HCC)  Assessment & Plan:  - out of control; no SI/HI  - Sertraline  - plan for psych to take over medication management; therapy would be fruitful  - pt was in a much better mood and more positive at the end of the encounter    Orders:  -     sertraline (ZOLOFT) 50 MG tablet; Take 1 tablet by mouth daily, Disp-90 tablet, R-1Normal  -     External Referral To Psychology  2. BRENNAN (generalized anxiety disorder)  Assessment & Plan:  - out of control  - Sertraline and PRN clonazepam  - plan for psych to take over medication management; therapy would be fruitful  - pt was in a much better mood and more positive at the end of the encounter   Orders:  -     sertraline (ZOLOFT) 50 MG tablet; Take 1 tablet by mouth daily, Disp-90 tablet, R-1Normal  -     clonazePAM (KLONOPIN) 0.5 MG tablet; Take 0.5 tablets by mouth 2 times daily as needed for Anxiety for up to 30 days. Max Daily Amount: 0.5 mg, Disp-30 tablet, R-0Normal  -     External Referral To Psychology  3. Attention deficit hyperactivity disorder (ADHD), combined type  -     Urine Drug Screen; Future      Follow-up and Dispositions    Return in about 2 months (around 2024) for mood and GI.         SUBJECTIVE/OBJECTIVE:  HPI  Depression/Anxiety/Bipolar/borderline personality disorder  - zoloft was effective, but pt feels she has developed tolerance  - abilify negative SE  - request behavioral health  - denies SI/HI  Update (24)  - would like to resume zoloft  - denies SI/HI; states she would never end her own life because \"I wouldn't be able to see my mother\"  - not able to meet ADLs due to mood  - scheduled to go to [facility] and [facility] psych    HM  - to see gyn ([facility]),

## 2024-03-19 NOTE — ASSESSMENT & PLAN NOTE
- out of control  - Sertraline and PRN clonazepam  - plan for psych to take over medication management; therapy would be fruitful  - pt was in a much better mood and more positive at the end of the encounter

## 2024-03-21 ENCOUNTER — TELEPHONE (OUTPATIENT)
Facility: CLINIC | Age: 34
End: 2024-03-21

## 2024-03-21 NOTE — TELEPHONE ENCOUNTER
Patient called in regards to gastroenterology referral. She states the location is not accepting new patients and would like to know next steps or be referred to another facility.            Please assist, Thank you.

## 2024-04-03 DIAGNOSIS — G56.22 CUBITAL TUNNEL SYNDROME ON LEFT: Primary | ICD-10-CM

## 2024-04-03 RX ORDER — DICLOFENAC SODIUM 75 MG/1
75 TABLET, DELAYED RELEASE ORAL 2 TIMES DAILY
Qty: 20 TABLET | Refills: 0 | Status: SHIPPED | OUTPATIENT
Start: 2024-04-03 | End: 2024-04-13

## 2024-04-03 RX ORDER — HYDROCODONE BITARTRATE AND ACETAMINOPHEN 5; 325 MG/1; MG/1
1 TABLET ORAL EVERY 6 HOURS PRN
Qty: 20 TABLET | Refills: 0 | Status: SHIPPED | OUTPATIENT
Start: 2024-04-03 | End: 2024-04-08

## 2024-04-10 DIAGNOSIS — R09.89 SINUS COMPLAINT: ICD-10-CM

## 2024-04-10 DIAGNOSIS — F90.2 ATTENTION DEFICIT HYPERACTIVITY DISORDER (ADHD), COMBINED TYPE: ICD-10-CM

## 2024-04-10 NOTE — TELEPHONE ENCOUNTER
Medication requested :   Requested Prescriptions     Pending Prescriptions Disp Refills    amphetamine-dextroamphetamine (ADDERALL) 20 MG tablet 60 tablet 0     Sig: Take 1 tablet by mouth 2 times daily for 30 days. Max Daily Amount: 2 tablets    Azelastine-Fluticasone 137-50 MCG/ACT SUSP 23 g 5     Si spray by Nasal route 2 times daily      PCP: Mason Oliveira MD  LOV:  2024 DMA: Visit date not found

## 2024-04-11 RX ORDER — AZELASTINE HYDROCHLORIDE, FLUTICASONE PROPIONATE 137; 50 UG/1; UG/1
1 SPRAY, METERED NASAL 2 TIMES DAILY
Qty: 23 G | Refills: 5 | Status: SHIPPED | OUTPATIENT
Start: 2024-04-11

## 2024-04-11 RX ORDER — DEXTROAMPHETAMINE SACCHARATE, AMPHETAMINE ASPARTATE, DEXTROAMPHETAMINE SULFATE AND AMPHETAMINE SULFATE 5; 5; 5; 5 MG/1; MG/1; MG/1; MG/1
1 TABLET ORAL 2 TIMES DAILY
Qty: 60 TABLET | Refills: 0 | Status: SHIPPED | OUTPATIENT
Start: 2024-04-11 | End: 2024-05-11

## 2024-04-12 ENCOUNTER — TELEPHONE (OUTPATIENT)
Facility: CLINIC | Age: 34
End: 2024-04-12

## 2024-04-12 NOTE — TELEPHONE ENCOUNTER
Juarez with [facility] called regarding referral to Gastroenterology sent.  They do not accept patient's insurance.    Thank you.

## 2024-04-18 ENCOUNTER — PATIENT MESSAGE (OUTPATIENT)
Facility: CLINIC | Age: 34
End: 2024-04-18

## 2024-04-18 DIAGNOSIS — K62.5 BLEEDING FROM ANUS: Primary | ICD-10-CM

## 2024-04-24 RX ORDER — FAMOTIDINE 20 MG/1
20 TABLET, FILM COATED ORAL 2 TIMES DAILY
Qty: 180 TABLET | Refills: 3 | Status: SHIPPED | OUTPATIENT
Start: 2024-04-24

## 2024-04-24 NOTE — TELEPHONE ENCOUNTER
From: Dede Valentine  To: Dr. Mason Oliveira  Sent: 4/18/2024 11:55 AM EDT  Subject: Stomach    I tried that that you suggested for my indigestion, I also tried Fam for a fast relief and got OTC Nexium to try again ( previous doctors have prescribed it to me in the past I just honestly didn’t give it enough time) I figured out after taking it the past almost three weeks that taking the nexium when I wake up daily, and then taking a at night before I go to sleep daily, helps my stomach problems more than anything I’ve ever tried. Is there any way you could prescribe those to me so that my insurance could pay?       Also speaking of my insurance, i had to pay for my adderall again this month, they said they still haven’t gotten a preauthorization from you for the treatment of my ADHD to have to take the adderall. Keagan ? lol

## 2024-04-24 NOTE — TELEPHONE ENCOUNTER
Jackie,  Have we completed a prior auth for Ms. Valentine's Adderall?  I will prescribe the famotidine and esomeprazole.    Mason Oliveira MD

## 2024-04-30 DIAGNOSIS — F41.1 GAD (GENERALIZED ANXIETY DISORDER): ICD-10-CM

## 2024-04-30 DIAGNOSIS — F33.2 SEVERE EPISODE OF RECURRENT MAJOR DEPRESSIVE DISORDER, WITHOUT PSYCHOTIC FEATURES (HCC): ICD-10-CM

## 2024-04-30 RX ORDER — FLUTICASONE PROPIONATE 50 MCG
1 SPRAY, SUSPENSION (ML) NASAL DAILY
COMMUNITY

## 2024-04-30 RX ORDER — CLONAZEPAM 0.5 MG/1
0.25 TABLET ORAL 2 TIMES DAILY PRN
Qty: 30 TABLET | Refills: 0 | Status: SHIPPED | OUTPATIENT
Start: 2024-04-30 | End: 2024-05-30

## 2024-04-30 NOTE — TELEPHONE ENCOUNTER
From: Dede Valentine  To: Office of Dr. Mason Oliveira  Sent: 4/27/2024 5:12 PM EDT  Subject: Medication Renewal Request    Refills have been requested for the following medications:     sertraline (ZOLOFT) 50 MG tablet [Dr. Mason Oliveira MD]     clonazePAM (KLONOPIN) 0.5 MG tablet [Dr. Mason Oliveira MD]    Preferred pharmacy: [address] [phone] [facility] [phone]

## 2024-05-13 DIAGNOSIS — F33.2 SEVERE EPISODE OF RECURRENT MAJOR DEPRESSIVE DISORDER, WITHOUT PSYCHOTIC FEATURES (HCC): ICD-10-CM

## 2024-05-13 DIAGNOSIS — F90.2 ATTENTION DEFICIT HYPERACTIVITY DISORDER (ADHD), COMBINED TYPE: ICD-10-CM

## 2024-05-13 DIAGNOSIS — F41.1 GAD (GENERALIZED ANXIETY DISORDER): ICD-10-CM

## 2024-05-13 NOTE — TELEPHONE ENCOUNTER
Medication requested :   Requested Prescriptions     Pending Prescriptions Disp Refills    amphetamine-dextroamphetamine (ADDERALL) 20 MG tablet 60 tablet 0     Sig: Take 1 tablet by mouth 2 times daily for 30 days. Max Daily Amount: 2 tablets    sertraline (ZOLOFT) 50 MG tablet 90 tablet 1     Sig: Take 1 tablet by mouth daily      PCP: Mason Oliveira MD  LOV:  03/19/2024  NOV DMA: 5/13/2024

## 2024-05-14 RX ORDER — DEXTROAMPHETAMINE SACCHARATE, AMPHETAMINE ASPARTATE, DEXTROAMPHETAMINE SULFATE AND AMPHETAMINE SULFATE 5; 5; 5; 5 MG/1; MG/1; MG/1; MG/1
1 TABLET ORAL 2 TIMES DAILY
Qty: 60 TABLET | Refills: 0 | OUTPATIENT
Start: 2024-05-14 | End: 2024-06-13

## 2024-05-30 DIAGNOSIS — F90.2 ATTENTION DEFICIT HYPERACTIVITY DISORDER (ADHD), COMBINED TYPE: ICD-10-CM

## 2024-05-30 DIAGNOSIS — K62.5 BLEEDING FROM ANUS: ICD-10-CM

## 2024-05-30 RX ORDER — DEXTROAMPHETAMINE SACCHARATE, AMPHETAMINE ASPARTATE, DEXTROAMPHETAMINE SULFATE AND AMPHETAMINE SULFATE 5; 5; 5; 5 MG/1; MG/1; MG/1; MG/1
1 TABLET ORAL 2 TIMES DAILY
Qty: 60 TABLET | Refills: 0 | OUTPATIENT
Start: 2024-05-30 | End: 2024-06-29

## 2024-05-30 NOTE — TELEPHONE ENCOUNTER
Medication(s) requesting:   Requested Prescriptions     Pending Prescriptions Disp Refills    amphetamine-dextroamphetamine (ADDERALL) 20 MG tablet 60 tablet 0     Sig: Take 1 tablet by mouth 2 times daily for 30 days. Max Daily Amount: 2 tablets    esomeprazole (NEXIUM) 20 MG delayed release capsule 90 capsule 3     Sig: Take 1 capsule by mouth with breakfast and with evening meal       Last office visit:  03/19/24  Next office visit DMA: Visit date not found

## 2024-05-30 NOTE — TELEPHONE ENCOUNTER
From: Dede Valentine  To: Office of Dr. Mason Oliveira  Sent: 5/28/2024 9:51 AM EDT  Subject: Medication Renewal Request    Refills have been requested for the following medications:     amphetamine-dextroamphetamine (ADDERALL) 20 MG tablet [Dr. Mason Oliveira MD]   Patient Comment: Asap     esomeprazole (NEXIUM) 20 MG delayed release capsule [Dr. Mason Oliveira MD]    Preferred pharmacy: [address] [phone] -  [phone]

## 2024-06-03 DIAGNOSIS — F90.2 ATTENTION DEFICIT HYPERACTIVITY DISORDER (ADHD), COMBINED TYPE: ICD-10-CM

## 2024-06-03 DIAGNOSIS — K62.5 BLEEDING FROM ANUS: ICD-10-CM

## 2024-06-03 NOTE — TELEPHONE ENCOUNTER
Patient is requesting a PA on the below medication, she has been out of the medication for over a week.      Please advise    Thank you

## 2024-06-04 ENCOUNTER — OFFICE VISIT (OUTPATIENT)
Age: 34
End: 2024-06-04
Payer: COMMERCIAL

## 2024-06-04 VITALS — BODY MASS INDEX: 26.29 KG/M2 | HEIGHT: 64 IN | WEIGHT: 154 LBS

## 2024-06-04 DIAGNOSIS — F90.2 ATTENTION DEFICIT HYPERACTIVITY DISORDER (ADHD), COMBINED TYPE: ICD-10-CM

## 2024-06-04 DIAGNOSIS — G89.29 CHRONIC BILATERAL LOW BACK PAIN WITH LEFT-SIDED SCIATICA: Primary | ICD-10-CM

## 2024-06-04 DIAGNOSIS — M54.42 CHRONIC BILATERAL LOW BACK PAIN WITH LEFT-SIDED SCIATICA: Primary | ICD-10-CM

## 2024-06-04 DIAGNOSIS — M53.3 SACROILIAC JOINT DISEASE: ICD-10-CM

## 2024-06-04 PROCEDURE — 99204 OFFICE O/P NEW MOD 45 MIN: CPT | Performed by: PHYSICAL MEDICINE & REHABILITATION

## 2024-06-04 PROCEDURE — 72100 X-RAY EXAM L-S SPINE 2/3 VWS: CPT | Performed by: PHYSICAL MEDICINE & REHABILITATION

## 2024-06-04 RX ORDER — DEXTROAMPHETAMINE SACCHARATE, AMPHETAMINE ASPARTATE, DEXTROAMPHETAMINE SULFATE AND AMPHETAMINE SULFATE 5; 5; 5; 5 MG/1; MG/1; MG/1; MG/1
1 TABLET ORAL 2 TIMES DAILY
Qty: 60 TABLET | Refills: 0 | OUTPATIENT
Start: 2024-06-04 | End: 2024-07-04

## 2024-06-04 RX ORDER — DEXTROAMPHETAMINE SACCHARATE, AMPHETAMINE ASPARTATE, DEXTROAMPHETAMINE SULFATE AND AMPHETAMINE SULFATE 5; 5; 5; 5 MG/1; MG/1; MG/1; MG/1
1 TABLET ORAL 2 TIMES DAILY
Qty: 60 TABLET | Refills: 0 | Status: CANCELLED | OUTPATIENT
Start: 2024-06-04 | End: 2024-07-04

## 2024-06-04 RX ORDER — LIDOCAINE 50 MG/G
1 PATCH TOPICAL DAILY
Qty: 30 PATCH | Refills: 0 | Status: SHIPPED | OUTPATIENT
Start: 2024-06-04 | End: 2024-07-04

## 2024-06-04 SDOH — HEALTH STABILITY: PHYSICAL HEALTH: ON AVERAGE, HOW MANY DAYS PER WEEK DO YOU ENGAGE IN MODERATE TO STRENUOUS EXERCISE (LIKE A BRISK WALK)?: 0 DAYS

## 2024-06-04 SDOH — HEALTH STABILITY: PHYSICAL HEALTH: ON AVERAGE, HOW MANY MINUTES DO YOU ENGAGE IN EXERCISE AT THIS LEVEL?: 0 MIN

## 2024-06-04 NOTE — TELEPHONE ENCOUNTER
Pt has a year's worth of Nexium, and she needs to be seen for the Adderall. She has missed some appointments and has one scheduled for 6/6.

## 2024-06-04 NOTE — PROGRESS NOTES
Dede Valentine presents today for   Chief Complaint   Patient presents with    Back Pain    Neck Pain     Lumbar  cervical       Is someone accompanying this pt? no    Is the patient using any DME equipment during OV? no    Depression Screening:       No data to display                Learning Assessment:  Failed to redirect to the Timeline version of the [employer] SmartLink.    Abuse Screening:       No data to display                Fall Risk  Failed to redirect to the Timeline version of the [employer] SmartLink.    OPIOID RISK TOOL  Failed to redirect to the Timeline version of the [employer] SmartLink.    Coordination of Care:  1. Have you been to the ER, urgent care clinic since your last visit? no  Hospitalized since your last visit? no    2. Have you seen or consulted any other health care providers outside of the [facility] since your last visit? no Include any pap smears or colon screening. no        Last Pain Agreement Signed NO    
is 26.43 kg/m². without any acute distress   Psychiatric: ?  Alert and oriented x 3 with normal mood    HEENT: ????????  Atraumatic   Respiratory:   Breathing non-labored and non dyspneic   CV: ???????????????? Peripheral pulses intact, no peripheral edema   Skin: ?????????????  No rashes       Neurologic: ??      Sensation: normal and grossly intact thebilateral, lower extremity(s)   Strength: 5/5 in the bilateral, lower extremity(s)  Reflexes: reveals 2+ symmetric DTRs throughout   Gait: normal       Musculoskeletal: Hip Exam      Alignment: Normal   Atrophy: None   Single leg stance: Abnormal pelvic shift      Tenderness to Palpation:   Anterior hip: Negative  Adductors: Negative  Greater trochanter: Negative  IT Band: Negative  Gluteal:Positive  SI Joint:  Positive++right   Lumbar paraspinals or spinous processes: Positive    ROM:   Hip Instability: None   Hip ROM: Normal pain  Lumbar ROM: No reproduction of pain with movement     Special Tests    Stinchfield: Positive  Log Roll: Negative  Scour:  Positive  KEMI: Positive  FADIR: Negative  SI joint compression: Positive  Gaenslen’s: Positive       Slump test, femoral stretch, SLR: Negative    Medical Decision Making:    Images: The imaging results as well as the actual images of the studies below were reviewed, visualized and interpreted by me.     Labs:  The results below were reviewed.   X-ray lumbar spine 6/4/2024- unremarkable      Assessment:   1. Chronic bilateral low back pain with left-sided sciatica  -     AMB POC XRAY, SPINE, LUMBOSACRAL; 2 O  2. Sacroiliac joint disease  -     [facility] - Stephanie Sánchez       Plan:      -Physical therapy -  referral to PT- SI joint mobilization, gluteal strengthening  -DME- Recommend serola SI belt   -Medications -   -lidocaine patch 5%  -voltaren gel . Counseled regarding side effects and appropriate administration of medications.    -Diagnostics/Imaging - x-ray lumbar spine

## 2024-06-05 NOTE — PROGRESS NOTES
Adderall refill request denied. Pt has missed a few appointments and needs to be seen for further refills.    Mason Oliveira MD     normal...

## 2024-06-06 ENCOUNTER — TELEMEDICINE (OUTPATIENT)
Facility: CLINIC | Age: 34
End: 2024-06-06
Payer: COMMERCIAL

## 2024-06-06 DIAGNOSIS — F33.2 SEVERE EPISODE OF RECURRENT MAJOR DEPRESSIVE DISORDER, WITHOUT PSYCHOTIC FEATURES (HCC): ICD-10-CM

## 2024-06-06 DIAGNOSIS — F41.1 GAD (GENERALIZED ANXIETY DISORDER): ICD-10-CM

## 2024-06-06 DIAGNOSIS — K62.5 BLEEDING FROM ANUS: ICD-10-CM

## 2024-06-06 DIAGNOSIS — F90.2 ATTENTION DEFICIT HYPERACTIVITY DISORDER (ADHD), COMBINED TYPE: ICD-10-CM

## 2024-06-06 PROCEDURE — G2211 COMPLEX E/M VISIT ADD ON: HCPCS | Performed by: STUDENT IN AN ORGANIZED HEALTH CARE EDUCATION/TRAINING PROGRAM

## 2024-06-06 PROCEDURE — 99213 OFFICE O/P EST LOW 20 MIN: CPT | Performed by: STUDENT IN AN ORGANIZED HEALTH CARE EDUCATION/TRAINING PROGRAM

## 2024-06-06 RX ORDER — DEXTROAMPHETAMINE SACCHARATE, AMPHETAMINE ASPARTATE, DEXTROAMPHETAMINE SULFATE AND AMPHETAMINE SULFATE 5; 5; 5; 5 MG/1; MG/1; MG/1; MG/1
1 TABLET ORAL 2 TIMES DAILY
Qty: 60 TABLET | Refills: 0 | Status: SHIPPED | OUTPATIENT
Start: 2024-06-06 | End: 2024-07-06

## 2024-06-06 RX ORDER — CLONAZEPAM 0.5 MG/1
0.25 TABLET ORAL 2 TIMES DAILY PRN
Qty: 180 TABLET | Refills: 1 | Status: SHIPPED | OUTPATIENT
Start: 2024-06-06 | End: 2025-06-01

## 2024-06-06 NOTE — PROGRESS NOTES
Dede Valentine, was evaluated through a synchronous (real-time) audio-video encounter. The patient (or guardian if applicable) is aware that this is a billable service, which includes applicable co-pays. This Virtual Visit was conducted with patient's (and/or legal guardian's) consent. Patient identification was verified, and a caregiver was present when appropriate.   The patient was located at Home: [address] [place] [id number]  Provider was located at Facility (Appt Dept): [address] [facility], [address]  [place] [address]  Confirm you are appropriately licensed, registered, or certified to deliver care in the state where the patient is located as indicated above. If you are not or unsure, please re-schedule the visit: Yes, I confirm.     Dede Valentine (:  1990) is a Established patient, presenting virtually for evaluation of the following:    Assessment & Plan   Below is the assessment and plan developed based on review of pertinent history, physical exam, labs, studies, and medications.  1. Attention deficit hyperactivity disorder (ADHD), combined type  Assessment & Plan:  - adderall absence is detrimental to her meeting ADLs  -  reviewed, nothing suspicious  - pt still looking for psych   Orders:  -     amphetamine-dextroamphetamine (ADDERALL) 20 MG tablet; Take 1 tablet by mouth 2 times daily for 30 days. Max Daily Amount: 2 tablets, Disp-60 tablet, R-0Normal  2. Severe episode of recurrent major depressive disorder, without psychotic features (HCC)  3. BRENNAN (generalized anxiety disorder)  Assessment & Plan:  - klonopin absence is detrimental to her meeting ADLs  -  reviewed, nothing suspicious  - pt still looking for psych   Orders:  -     clonazePAM (KLONOPIN) 0.5 MG tablet; Take 0.5 tablets by mouth 2 times daily as needed for Anxiety for up to 360 days. Max Daily Amount: 0.5 mg, Disp-180 tablet, R-1Normal  4. Bleeding from anus  -     esomeprazole (NEXIUM) 20 MG delayed release

## 2024-06-07 ENCOUNTER — TELEPHONE (OUTPATIENT)
Facility: CLINIC | Age: 34
End: 2024-06-07

## 2024-06-07 DIAGNOSIS — F90.2 ATTENTION DEFICIT HYPERACTIVITY DISORDER (ADHD), COMBINED TYPE: ICD-10-CM

## 2024-06-07 PROBLEM — F41.1 GAD (GENERALIZED ANXIETY DISORDER): Status: ACTIVE | Noted: 2024-06-07

## 2024-06-07 PROBLEM — F33.2 SEVERE EPISODE OF RECURRENT MAJOR DEPRESSIVE DISORDER, WITHOUT PSYCHOTIC FEATURES (HCC): Status: ACTIVE | Noted: 2024-06-07

## 2024-06-07 RX ORDER — DEXTROAMPHETAMINE SACCHARATE, AMPHETAMINE ASPARTATE, DEXTROAMPHETAMINE SULFATE AND AMPHETAMINE SULFATE 5; 5; 5; 5 MG/1; MG/1; MG/1; MG/1
1 TABLET ORAL 2 TIMES DAILY
Qty: 60 TABLET | Refills: 0 | Status: CANCELLED | OUTPATIENT
Start: 2024-06-07 | End: 2024-07-07

## 2024-06-07 NOTE — TELEPHONE ENCOUNTER
I was not informed which mediation needs to be refilled. She has plenty of zoloft and pepcid. I refilled adderall, klonopin, and nexium yesterday. Dr. Mcgraw can refill the meds she prescribed her, voltaren gel and lidoderm.

## 2024-06-07 NOTE — TELEPHONE ENCOUNTER
Patient was in  the office yesterday,  is currently out of medication    Please advise    Thank you

## 2024-06-07 NOTE — ASSESSMENT & PLAN NOTE
- adderall absence is detrimental to her meeting ADLs  -  reviewed, nothing suspicious  - pt still looking for psych

## 2024-06-07 NOTE — ASSESSMENT & PLAN NOTE
- klonopin absence is detrimental to her meeting ADLs  -  reviewed, nothing suspicious  - pt still looking for psych

## 2024-06-11 ENCOUNTER — TELEPHONE (OUTPATIENT)
Facility: CLINIC | Age: 34
End: 2024-06-11

## 2024-06-11 NOTE — TELEPHONE ENCOUNTER
Called patient to get more information from a message sent on 06/11/2024. No answer, unable to leave VM.

## 2024-06-14 RX ORDER — CETIRIZINE HYDROCHLORIDE 10 MG/1
10 TABLET ORAL DAILY
COMMUNITY
Start: 2024-05-18 | End: 2024-06-14 | Stop reason: SDUPTHER

## 2024-06-14 RX ORDER — CETIRIZINE HYDROCHLORIDE 10 MG/1
10 TABLET ORAL DAILY
Qty: 90 TABLET | Refills: 0 | Status: SHIPPED | OUTPATIENT
Start: 2024-06-14

## 2024-06-14 NOTE — TELEPHONE ENCOUNTER
Medication(s) requesting:   Requested Prescriptions     Pending Prescriptions Disp Refills    cetirizine (ZYRTEC) 10 MG tablet 90 tablet 0     Sig: Take 1 tablet by mouth daily       Last office visit:  6.6.24  Next office visit DMA: Visit date not found

## 2024-06-18 ENCOUNTER — TELEPHONE (OUTPATIENT)
Facility: CLINIC | Age: 34
End: 2024-06-18

## 2024-06-18 DIAGNOSIS — F90.2 ATTENTION DEFICIT HYPERACTIVITY DISORDER (ADHD), COMBINED TYPE: ICD-10-CM

## 2024-06-18 RX ORDER — DEXTROAMPHETAMINE SACCHARATE, AMPHETAMINE ASPARTATE, DEXTROAMPHETAMINE SULFATE AND AMPHETAMINE SULFATE 5; 5; 5; 5 MG/1; MG/1; MG/1; MG/1
1 TABLET ORAL 2 TIMES DAILY
Qty: 60 TABLET | Refills: 0 | Status: SHIPPED | OUTPATIENT
Start: 2024-06-18 | End: 2024-07-18

## 2024-06-18 NOTE — TELEPHONE ENCOUNTER
[facility] us stating PA for adderall approved. Thru 6/18/24- 6/18/2025. Patient was notified via [facility].

## 2024-07-17 DIAGNOSIS — F90.2 ATTENTION DEFICIT HYPERACTIVITY DISORDER (ADHD), COMBINED TYPE: ICD-10-CM

## 2024-07-17 RX ORDER — DEXTROAMPHETAMINE SACCHARATE, AMPHETAMINE ASPARTATE, DEXTROAMPHETAMINE SULFATE AND AMPHETAMINE SULFATE 5; 5; 5; 5 MG/1; MG/1; MG/1; MG/1
1 TABLET ORAL 2 TIMES DAILY
Qty: 60 TABLET | Refills: 0 | Status: SHIPPED | OUTPATIENT
Start: 2024-07-17 | End: 2024-08-16

## 2024-07-17 NOTE — TELEPHONE ENCOUNTER
From: Dede Valentine  To: Office of Dr. Mason Oliveira  Sent: 7/16/2024 10:33 PM EDT  Subject: Medication Renewal Request    Refills have been requested for the following medications:     amphetamine-dextroamphetamine (ADDERALL) 20 MG tablet [Dr. Mason Oliveira MD]    Preferred pharmacy: [address] [phone] -  [phone]

## 2024-07-17 NOTE — TELEPHONE ENCOUNTER
Medication(s) requesting:   Requested Prescriptions     Pending Prescriptions Disp Refills    amphetamine-dextroamphetamine (ADDERALL) 20 MG tablet 60 tablet 0     Sig: Take 1 tablet by mouth 2 times daily for 30 days. Max Daily Amount: 2 tablets       Last office visit:  6.6.24  Next office visit DMA: Visit date not found

## 2024-07-18 DIAGNOSIS — F41.1 GAD (GENERALIZED ANXIETY DISORDER): ICD-10-CM

## 2024-07-18 RX ORDER — CLONAZEPAM 0.5 MG/1
0.25 TABLET ORAL 2 TIMES DAILY PRN
Qty: 30 TABLET | Refills: 0 | Status: SHIPPED | OUTPATIENT
Start: 2024-07-22 | End: 2024-08-21

## 2024-07-18 NOTE — TELEPHONE ENCOUNTER
From: Dede Valentine  To: Office of Dr. Mason Oliveira  Sent: 7/17/2024 10:30 AM EDT  Subject: Medication Renewal Request    Refills have been requested for the following medications:     clonazePAM (KLONOPIN) 0.5 MG tablet [Dr. Mason Oliveira MD]   Patient Comment: Needs authorization     Preferred pharmacy: [address] [phone] -  [phone]

## 2024-12-07 DIAGNOSIS — F41.1 GAD (GENERALIZED ANXIETY DISORDER): ICD-10-CM

## 2024-12-27 NOTE — TELEPHONE ENCOUNTER
Medication(s) requesting:   Requested Prescriptions     Pending Prescriptions Disp Refills    clonazePAM (KLONOPIN) 0.5 MG tablet [Pharmacy Med Name: clonazePAM 0.5 MG Oral Tablet] 30 tablet 0     Sig: TAKE 1/2 (ONE-HALF) TABLET BY MOUTH TWICE DAILY AS NEEDED FOR ANXIETY UP TO FOR 30 DAYS-----MAX DAILY AMOUNT: 0.5 MG        Last office visit:  06/06/2024  Next office visit DMA: Visit date not found

## 2024-12-30 RX ORDER — CLONAZEPAM 0.5 MG/1
TABLET ORAL
Qty: 30 TABLET | Refills: 0 | OUTPATIENT
Start: 2024-12-30